# Patient Record
Sex: MALE | Race: WHITE | NOT HISPANIC OR LATINO | Employment: FULL TIME | URBAN - NONMETROPOLITAN AREA
[De-identification: names, ages, dates, MRNs, and addresses within clinical notes are randomized per-mention and may not be internally consistent; named-entity substitution may affect disease eponyms.]

---

## 2022-06-27 ENCOUNTER — OFFICE VISIT (OUTPATIENT)
Dept: FAMILY MEDICINE CLINIC | Facility: CLINIC | Age: 67
End: 2022-06-27

## 2022-06-27 VITALS
WEIGHT: 254 LBS | HEIGHT: 70 IN | OXYGEN SATURATION: 99 % | BODY MASS INDEX: 36.36 KG/M2 | SYSTOLIC BLOOD PRESSURE: 124 MMHG | TEMPERATURE: 96.8 F | DIASTOLIC BLOOD PRESSURE: 80 MMHG | HEART RATE: 76 BPM

## 2022-06-27 DIAGNOSIS — E66.09 CLASS 2 OBESITY DUE TO EXCESS CALORIES WITHOUT SERIOUS COMORBIDITY WITH BODY MASS INDEX (BMI) OF 36.0 TO 36.9 IN ADULT: ICD-10-CM

## 2022-06-27 DIAGNOSIS — M67.472 GANGLION CYST OF LEFT FOOT: Primary | ICD-10-CM

## 2022-06-27 PROBLEM — M81.0 OSTEOPOROSIS: Status: ACTIVE | Noted: 2019-08-14

## 2022-06-27 PROBLEM — E66.812 CLASS 2 OBESITY DUE TO EXCESS CALORIES WITHOUT SERIOUS COMORBIDITY WITH BODY MASS INDEX (BMI) OF 36.0 TO 36.9 IN ADULT: Status: ACTIVE | Noted: 2022-06-27

## 2022-06-27 PROBLEM — E27.40 ADRENAL INSUFFICIENCY: Status: ACTIVE | Noted: 2017-06-15

## 2022-06-27 PROBLEM — H72.90 TYMPANIC MEMBRANE PERFORATION: Status: ACTIVE | Noted: 2018-11-13

## 2022-06-27 PROBLEM — Z79.83 LONG TERM (CURRENT) USE OF BISPHOSPHONATES: Status: ACTIVE | Noted: 2020-09-28

## 2022-06-27 PROBLEM — H66.90 ACUTE OTITIS MEDIA: Status: ACTIVE | Noted: 2018-11-13

## 2022-06-27 PROCEDURE — 99213 OFFICE O/P EST LOW 20 MIN: CPT | Performed by: FAMILY MEDICINE

## 2022-06-27 RX ORDER — HYDROCORTISONE 10 MG/1
TABLET ORAL
COMMUNITY
Start: 2022-06-02

## 2022-06-27 RX ORDER — LEVOTHYROXINE SODIUM 175 UG/1
1 TABLET ORAL DAILY
COMMUNITY
Start: 2022-04-12

## 2022-06-27 RX ORDER — LACOSAMIDE 100 MG/1
TABLET, FILM COATED ORAL
COMMUNITY
Start: 2022-06-12

## 2022-06-27 RX ORDER — TESTOSTERONE 16.2 MG/G
GEL TRANSDERMAL
COMMUNITY
Start: 2022-06-07

## 2022-06-27 NOTE — ASSESSMENT & PLAN NOTE
We will see if we can get him into podiatry a little bit sooner to talk about options for drainage or removal.

## 2022-06-27 NOTE — PROGRESS NOTES
Date: 2022   Patient Name: Romero Trujillo  : 1955   MRN: [id number]     Chief Complaint:    Chief Complaint   Patient presents with   • Foot Lesion     Left foot        History of Present Illness: Romero Trujillo is a 67 y.o. male who is here today for Lesion of the left foot.  He reports a mass overlying the DIP joint of his left second toe.  He states this has been present for a couple of months but now is rubbing on his shoes and has become painful.  He denies drainage from the lesion or surrounding erythema.  He does report callus overlying the lesion from rubbing on clothing and shoes.  He has called podiatry but they cannot get him in for over a month.  He is wondering if anything else can be done to help with his discomfort.           Review of Systems:   Review of Systems   Constitutional: Negative for fatigue.   Eyes: Negative for blurred vision.   Respiratory: Negative for cough, chest tightness and shortness of breath.    Cardiovascular: Negative for chest pain, palpitations and leg swelling.   Gastrointestinal: Negative for abdominal pain, constipation, diarrhea, nausea and vomiting.   Skin: Positive for skin lesions. Negative for rash.   Neurological: Negative for dizziness, tremors and headache.   Psychiatric/Behavioral: Negative for depressed mood. The patient is not nervous/anxious.        Past Medical History:   Past Medical History:   Diagnosis Date   • Hyperthyroidism        Past Surgical History:   Past Surgical History:   Procedure Laterality Date   • TUMOR REMOVAL         Family History: History reviewed. No pertinent family history.    Social History:   Social History     Socioeconomic History   • Marital status:      Spouse name: Maylin   • Number of children: 2   Tobacco Use   • Smoking status: Never Smoker   • Smokeless tobacco: Never Used   Vaping Use   • Vaping Use: Never used   Substance and Sexual Activity   • Alcohol use: Never   • Drug use: Never   •  "Sexual activity: Defer       Medications:     Current Outpatient Medications:   •  hydrocortisone (CORTEF) 10 MG tablet, TAKE 1 TABLET BY MOUTH TWICE DAILY. INCREASE DOSE WITH STRESS, Disp: , Rfl:   •  levothyroxine (SYNTHROID, LEVOTHROID) 175 MCG tablet, Take 1 tablet by mouth Daily., Disp: , Rfl:   •  Testosterone 20.25 MG/ACT (1.62%) gel, Apply 2 pumps daily, Disp: , Rfl:   •  Vimpat 100 MG tablet tablet, TAKE 1 AND 1/2 TABLET BY MOUTH EVERY MORNING AND 1 TABLET EVERY EVENING, Disp: , Rfl:     Allergies:   Allergies   Allergen Reactions   • Sulfacetamide Other (See Comments) and Unknown (See Comments)     was too little to remember         Physical Exam:  Vital Signs:   Vitals:    06/27/22 1316   BP: 124/80   BP Location: Left arm   Patient Position: Sitting   Cuff Size: Large Adult   Pulse: 76   Temp: 96.8 °F (36 °C)   TempSrc: Temporal   SpO2: 99%   Weight: 115 kg (254 lb)   Height: 177.8 cm (70\")     Body mass index is 36.45 kg/m².     Physical Exam  Vitals and nursing note reviewed.   Constitutional:       Appearance: Normal appearance. He is obese.   HENT:      Head: Normocephalic.   Eyes:      Conjunctiva/sclera: Conjunctivae normal.   Pulmonary:      Effort: Pulmonary effort is normal.   Musculoskeletal:      Comments: Ganglion cyst of DIP joint of the left second toe with overlying callus formation.  No purulent drainage or surrounding erythema   Neurological:      Mental Status: He is alert and oriented to person, place, and time.   Psychiatric:         Mood and Affect: Mood normal.         Behavior: Behavior normal.           Assessment/Plan:   Diagnoses and all orders for this visit:    1. Ganglion cyst of left foot (Primary)  Assessment & Plan:  We will see if we can get him into podiatry a little bit sooner to talk about options for drainage or removal.       2. Class 2 obesity due to excess calories without serious comorbidity with body mass index (BMI) of 36.0 to 36.9 in adult  Assessment & " Plan:  Patient's (Body mass index is 36.45 kg/m².) indicates that they are obese (BMI >30) with health conditions that include none . Weight is unchanged. BMI is is above average; BMI management plan is completed. We discussed portion control and increasing exercise.            Follow Up:   Return if symptoms worsen or fail to improve.    Geraldine Williamson, DO  [facility]

## 2022-06-27 NOTE — ASSESSMENT & PLAN NOTE
Patient's (Body mass index is 36.45 kg/m².) indicates that they are obese (BMI >30) with health conditions that include none . Weight is unchanged. BMI is is above average; BMI management plan is completed. We discussed portion control and increasing exercise.

## 2022-07-25 ENCOUNTER — TELEPHONE (OUTPATIENT)
Dept: FAMILY MEDICINE CLINIC | Facility: CLINIC | Age: 67
End: 2022-07-25

## 2022-07-25 NOTE — TELEPHONE ENCOUNTER
PATIENT HAS POSITIVE COVID.  SYMPTOMS INCLUDE FEVER, DON'T FEEL GOOD.  A LITTLE COUGH EARLIER BUT NOT NOW.    WHAT TO DO OR TAKE?    PLEASE CALL [phone]

## 2023-04-07 ENCOUNTER — OFFICE VISIT (OUTPATIENT)
Dept: FAMILY MEDICINE CLINIC | Facility: CLINIC | Age: 68
End: 2023-04-07
Payer: COMMERCIAL

## 2023-04-07 VITALS
BODY MASS INDEX: 35.37 KG/M2 | HEART RATE: 77 BPM | HEIGHT: 70 IN | WEIGHT: 247.1 LBS | OXYGEN SATURATION: 99 % | DIASTOLIC BLOOD PRESSURE: 82 MMHG | SYSTOLIC BLOOD PRESSURE: 125 MMHG

## 2023-04-07 DIAGNOSIS — R60.0 LOCALIZED EDEMA: ICD-10-CM

## 2023-04-07 DIAGNOSIS — Z12.11 COLON CANCER SCREENING: ICD-10-CM

## 2023-04-07 DIAGNOSIS — M72.2 PLANTAR FASCIITIS OF LEFT FOOT: Primary | ICD-10-CM

## 2023-04-07 RX ORDER — HYDROCODONE BITARTRATE AND ACETAMINOPHEN 7.5; 325 MG/1; MG/1
TABLET ORAL SEE ADMIN INSTRUCTIONS
COMMUNITY
Start: 2022-12-22 | End: 2023-04-07

## 2023-04-07 RX ORDER — MELOXICAM 15 MG/1
15 TABLET ORAL DAILY
Qty: 30 TABLET | Refills: 1 | Status: SHIPPED | OUTPATIENT
Start: 2023-04-07

## 2023-04-07 NOTE — PROGRESS NOTES
"Chief Complaint  left leg pain    Subjective          Romero Trujillo presents to [facility]  History of Present Illness  Patient comes in today saying he has some generalized edema of both his legs left 1 slightly worse he reports he also is having some tenderness of the lateral side of his foot and also towards the heel he says it is worse when he first gets up tries to walk and moves around says been going on for a few weeks he denies any other difficulties or trauma at this point in time      Objective   Vital Signs:   /82   Pulse 77   Ht 177.8 cm (70\")   Wt 112 kg (247 lb 1.6 oz)   SpO2 99%   BMI 35.46 kg/m²     Body mass index is 35.46 kg/m².    Review of Systems   Constitutional: Negative.    HENT: Negative for congestion, dental problem, ear discharge, ear pain and sore throat.    Respiratory: Negative for apnea, chest tightness and shortness of breath.    Cardiovascular: Positive for leg swelling.   Gastrointestinal: Negative for constipation and nausea.   Endocrine: Negative for polyuria.   Genitourinary: Negative for difficulty urinating.   Musculoskeletal: Positive for arthralgias. Negative for gait problem.   Skin: Negative for rash.   Hematological: Negative for adenopathy.       Past History:  Medical History: has a past medical history of Hyperthyroidism.   Surgical History: has a past surgical history that includes Tumor removal.         Current Outpatient Medications:   •  hydrocortisone (CORTEF) 10 MG tablet, TAKE 1 TABLET BY MOUTH TWICE DAILY. INCREASE DOSE WITH STRESS, Disp: , Rfl:   •  meloxicam (MOBIC) 15 MG tablet, Take 1 tablet by mouth Daily., Disp: 30 tablet, Rfl: 1  •  Testosterone 20.25 MG/ACT (1.62%) gel, Apply 2 pumps daily, Disp: , Rfl:   •  Vimpat 100 MG tablet tablet, TAKE 1 AND 1/2 TABLET BY MOUTH EVERY MORNING AND 1 TABLET EVERY EVENING, Disp: , Rfl:     Allergies: Sulfacetamide    Physical Exam  Vitals reviewed.   Constitutional:       " Appearance: Normal appearance.   HENT:      Head: Normocephalic.      Right Ear: Tympanic membrane, ear canal and external ear normal.      Left Ear: Tympanic membrane, ear canal and external ear normal.      Nose: Nose normal.      Mouth/Throat:      Pharynx: Oropharynx is clear.   Eyes:      Pupils: Pupils are equal, round, and reactive to light.   Cardiovascular:      Rate and Rhythm: Normal rate and regular rhythm.      Pulses: Normal pulses.      Comments: Trace edema on right leg  Pulmonary:      Effort: Pulmonary effort is normal.      Breath sounds: Normal breath sounds.   Abdominal:      General: Abdomen is flat. Bowel sounds are normal.      Palpations: Abdomen is soft.   Musculoskeletal:         General: Normal range of motion.      Left lower le+ Pitting Edema present.      Comments: Tenderness of the calcaneus on the left bottom of the foot slight tenderness of the Achilles tendon and also of the ankle he does have edema of around the ankle as well the left foot and he has negative Homans signs bilaterally   Skin:     General: Skin is warm and dry.   Neurological:      General: No focal deficit present.      Mental Status: He is alert and oriented to person, place, and time.          Result Review :                   Assessment and Plan    Diagnoses and all orders for this visit:    1. Plantar fasciitis of left foot (Primary)  Comments:  Celebrex exercises arch supports monitor elevation and see if this does not improve    2. Colon cancer screening  Comments:  We will arrange Cologuard  Orders:  -     Cologuard - Stool, Per Rectum; Future    3. Localized edema  Comments:  Discussed pressure stockings elevations and monitor    Other orders  -     meloxicam (MOBIC) 15 MG tablet; Take 1 tablet by mouth Daily.  Dispense: 30 tablet; Refill: 1              Follow Up   No follow-ups on file.  Patient was given instructions and counseling regarding his condition or for health maintenance advice. Please see  specific information pulled into the AVS if appropriate.     Ben Sood MD

## 2023-05-02 ENCOUNTER — OFFICE VISIT (OUTPATIENT)
Dept: FAMILY MEDICINE CLINIC | Facility: CLINIC | Age: 68
End: 2023-05-02
Payer: COMMERCIAL

## 2023-05-02 VITALS
TEMPERATURE: 97.5 F | BODY MASS INDEX: 35.07 KG/M2 | SYSTOLIC BLOOD PRESSURE: 130 MMHG | OXYGEN SATURATION: 96 % | HEART RATE: 95 BPM | DIASTOLIC BLOOD PRESSURE: 92 MMHG | WEIGHT: 245 LBS | HEIGHT: 70 IN

## 2023-05-02 DIAGNOSIS — K62.89 ANAL OR RECTAL PAIN: Primary | ICD-10-CM

## 2023-05-02 RX ORDER — AMOXICILLIN AND CLAVULANATE POTASSIUM 875; 125 MG/1; MG/1
1 TABLET, FILM COATED ORAL 2 TIMES DAILY
Qty: 20 TABLET | Refills: 0 | Status: SHIPPED | OUTPATIENT
Start: 2023-05-02

## 2023-05-02 NOTE — PROGRESS NOTES
Office Note     Name: Romero Trujillo    : 1955     MRN: [id number]     Chief Complaint  Hemorrhoids    Subjective     History of Present Illness:  Romero Trujillo is a 68 y.o. male who presents today for eval of hemorrhoids for about a week.  He states that he has not had any bleeding, but he has tenderness around his anus and has had a little bit of difficulty having a bowel movement.  He does not recall straining in any way prior to the symptoms.  He denies having any diarrhea or constipation.  He states that he normally has daily BMs, which are soft but not loose.  He states that he has been using Preparation H on the area for a week, but he has not noticed any improvement.  He has never had a colonoscopy, but he did recently complete a Cologuard, which was negative.    Review of Systems:   Review of Systems   Constitutional: Negative for activity change and unexpected weight loss.   Gastrointestinal: Positive for rectal pain. Negative for abdominal pain, anal bleeding, blood in stool, constipation, diarrhea, nausea and vomiting.       Past Medical History:   Past Medical History:   Diagnosis Date   • Hyperthyroidism        Past Surgical History:   Past Surgical History:   Procedure Laterality Date   • TUMOR REMOVAL         Family History: History reviewed. No pertinent family history.    Social History:   Social History     Socioeconomic History   • Marital status:      Spouse name: Maylin   • Number of children: 2   Tobacco Use   • Smoking status: Never   • Smokeless tobacco: Never   Vaping Use   • Vaping Use: Never used   Substance and Sexual Activity   • Alcohol use: Never   • Drug use: Never   • Sexual activity: Defer       Immunizations:   Immunization History   Administered Date(s) Administered   • COVID-19 (PFIZER) Purple Cap Monovalent 2021, 2021, 10/31/2021   • Flu Vaccine Split Quad 10/28/2013   • FluLaval/Fluzone >6mos 10/28/2020   • Fluzone High-Dose 65+yrs  "10/25/2022   • Fluzone Quad >6mos (Multi-dose) 11/24/2019, 10/28/2020   • Influenza, Unspecified 10/28/2020   • Zoster, Unspecified 12/08/2016        Medications:     Current Outpatient Medications:   •  hydrocortisone (CORTEF) 10 MG tablet, TAKE 1 TABLET BY MOUTH TWICE DAILY. INCREASE DOSE WITH STRESS, Disp: , Rfl:   •  meloxicam (MOBIC) 15 MG tablet, Take 1 tablet by mouth Daily., Disp: 30 tablet, Rfl: 1  •  Testosterone 20.25 MG/ACT (1.62%) gel, Apply 2 pumps daily, Disp: , Rfl:   •  Vimpat 100 MG tablet tablet, TAKE 1 AND 1/2 TABLET BY MOUTH EVERY MORNING AND 1 TABLET EVERY EVENING, Disp: , Rfl:   •  amoxicillin-clavulanate (Augmentin) 875-125 MG per tablet, Take 1 tablet by mouth 2 (Two) Times a Day., Disp: 20 tablet, Rfl: 0    Allergies:   Allergies   Allergen Reactions   • Sulfacetamide Other (See Comments) and Unknown (See Comments)     was too little to remember       Objective     Vital Signs  /92 (BP Location: Left arm, Patient Position: Sitting, Cuff Size: Large Adult)   Pulse 95   Temp 97.5 °F (36.4 °C) (Temporal)   Ht 177.8 cm (70\")   Wt 111 kg (245 lb)   SpO2 96%   BMI 35.15 kg/m²   Estimated body mass index is 35.15 kg/m² as calculated from the following:    Height as of this encounter: 177.8 cm (70\").    Weight as of this encounter: 111 kg (245 lb).    Physical Exam  Vitals and nursing note reviewed. Exam conducted with a chaperone present.   Constitutional:       General: He is not in acute distress.     Appearance: Normal appearance.   Cardiovascular:      Rate and Rhythm: Normal rate and regular rhythm.      Pulses: Normal pulses.      Heart sounds: Normal heart sounds.   Pulmonary:      Effort: Pulmonary effort is normal.      Breath sounds: Normal breath sounds.   Genitourinary:     Rectum: Tenderness and external hemorrhoid ( No erythema or swelling) present. No anal fissure.       Neurological:      General: No focal deficit present.      Mental Status: He is alert and oriented to " person, place, and time.      Cranial Nerves: No cranial nerve deficit.      Gait: Gait normal.   Psychiatric:         Mood and Affect: Mood normal.         Behavior: Behavior normal.         Thought Content: Thought content normal.         Judgment: Judgment normal.        Assessment and Plan     Assessment/Plan:  Diagnoses and all orders for this visit:    1. Anal or rectal pain (Primary)  Assessment & Plan:  I will start a course of antibiotics today, and I advised symptomatic treatment, such as sitz bath.  I recommend stool softeners as well to prevent further irritation.  We will recheck in 1 week, and refer if needed.  Patient advised to contact us if symptoms worsen, and he is in agreement.    Orders:  -     amoxicillin-clavulanate (Augmentin) 875-125 MG per tablet; Take 1 tablet by mouth 2 (Two) Times a Day.  Dispense: 20 tablet; Refill: 0      Follow Up  Return in about 1 week (around 5/9/2023) for Recheck.    Rosalie Lua PA-C  [facility]

## 2023-05-03 PROBLEM — K62.89 ANAL OR RECTAL PAIN: Status: ACTIVE | Noted: 2023-05-03

## 2023-05-03 NOTE — ASSESSMENT & PLAN NOTE
I will start a course of antibiotics today, and I advised symptomatic treatment, such as sitz bath.  I recommend stool softeners as well to prevent further irritation.  We will recheck in 1 week, and refer if needed.  Patient advised to contact us if symptoms worsen, and he is in agreement.

## 2023-05-08 ENCOUNTER — OFFICE VISIT (OUTPATIENT)
Dept: FAMILY MEDICINE CLINIC | Facility: CLINIC | Age: 68
End: 2023-05-08
Payer: COMMERCIAL

## 2023-05-08 VITALS
HEIGHT: 70 IN | HEART RATE: 68 BPM | SYSTOLIC BLOOD PRESSURE: 130 MMHG | BODY MASS INDEX: 35.28 KG/M2 | TEMPERATURE: 97.1 F | OXYGEN SATURATION: 98 % | WEIGHT: 246.4 LBS | DIASTOLIC BLOOD PRESSURE: 90 MMHG

## 2023-05-08 DIAGNOSIS — K62.89 ANAL OR RECTAL PAIN: Primary | ICD-10-CM

## 2023-05-08 NOTE — ASSESSMENT & PLAN NOTE
Symptoms have significantly improved with antibiotics, so I advised him to complete full course.  I do recommend that he see surgery to evaluate for internal hemorrhoids and for the source of the infection.  He prefers to have this evaluation as well.

## 2024-06-13 ENCOUNTER — OFFICE VISIT (OUTPATIENT)
Dept: FAMILY MEDICINE CLINIC | Facility: CLINIC | Age: 69
End: 2024-06-13
Payer: COMMERCIAL

## 2024-06-13 ENCOUNTER — TELEPHONE (OUTPATIENT)
Dept: FAMILY MEDICINE CLINIC | Facility: CLINIC | Age: 69
End: 2024-06-13

## 2024-06-13 VITALS
SYSTOLIC BLOOD PRESSURE: 118 MMHG | TEMPERATURE: 97.8 F | WEIGHT: 241 LBS | BODY MASS INDEX: 34.5 KG/M2 | DIASTOLIC BLOOD PRESSURE: 76 MMHG | HEIGHT: 70 IN | OXYGEN SATURATION: 97 % | HEART RATE: 74 BPM

## 2024-06-13 DIAGNOSIS — K64.8 INTERNAL HEMORRHOIDS WITHOUT COMPLICATION: Primary | ICD-10-CM

## 2024-06-13 PROCEDURE — 99213 OFFICE O/P EST LOW 20 MIN: CPT | Performed by: NURSE PRACTITIONER

## 2024-06-13 RX ORDER — HYDROCORTISONE ACETATE PRAMOXINE HCL 1; 1 G/100G; G/100G
CREAM TOPICAL
Qty: 1 EACH | Refills: 1 | Status: SHIPPED | OUTPATIENT
Start: 2024-06-13 | End: 2024-06-20

## 2024-06-13 RX ORDER — HYDROCORTISONE ACETATE 25 MG/1
25 SUPPOSITORY RECTAL 2 TIMES DAILY PRN
Qty: 14 SUPPOSITORY | Refills: 0 | Status: SHIPPED | OUTPATIENT
Start: 2024-06-13 | End: 2024-06-13

## 2024-06-13 NOTE — PROGRESS NOTES
Office Note     Name: Romero Trujillo    : 1955     MRN: [id number]     Chief Complaint  Hemorrhoids (X3 days)    Subjective     History of Present Illness:  Romero Trujillo is a 69 y.o. male who presents today for possible hemorrhoid. He admits he strained when having a BM while traveling. He states his anal area hurts when he sits - he denies seeing any external hemorrhoids.  He has had this in the past (approx. 1 year) and was treated with medication successfully. He was referred to surgeon but it was determined he did not need any procedure at that time. He denies seeing any blood in stool or when he wipes. Had negative Cologuard in 2023. Denies any diarrhea, abd pain, fever or unexplained weight loss.    Review of Systems:   Review of Systems as per HPI.    Family History: History reviewed. No pertinent family history.    Social History:   Social History     Socioeconomic History    Marital status:      Spouse name: Maylin    Number of children: 2   Tobacco Use    Smoking status: Never    Smokeless tobacco: Never   Vaping Use    Vaping status: Never Used   Substance and Sexual Activity    Alcohol use: Never    Drug use: Never    Sexual activity: Defer       Past Medical History:   Past Medical History:   Diagnosis Date    Hyperthyroidism        Past Surgical History:   Past Surgical History:   Procedure Laterality Date    TUMOR REMOVAL         Immunizations:   Immunization History   Administered Date(s) Administered    COVID-19 (PFIZER) Purple Cap Monovalent 2021, 2021, 10/31/2021    Flu Vaccine Split Quad 10/28/2013    Fluzone (or Fluarix & Flulaval for VFC) >6mos 10/28/2020    Fluzone High-Dose 65+yrs 10/25/2022    Fluzone Quad >6mos (Multi-dose) 2019, 10/28/2020    Influenza, Unspecified 10/28/2020    Zoster, Unspecified 2016        Medications:     Current Outpatient Medications:     hydrocortisone (CORTEF) 10 MG tablet, TAKE 1 TABLET BY MOUTH TWICE  "DAILY. INCREASE DOSE WITH STRESS, Disp: , Rfl:     meloxicam (MOBIC) 15 MG tablet, Take 1 tablet by mouth Daily., Disp: 30 tablet, Rfl: 1    Testosterone 20.25 MG/ACT (1.62%) gel, Apply 2 pumps daily, Disp: , Rfl:     Vimpat 100 MG tablet tablet, TAKE 1 AND 1/2 TABLET BY MOUTH EVERY MORNING AND 1 TABLET EVERY EVENING, Disp: , Rfl:     amoxicillin-clavulanate (Augmentin) 875-125 MG per tablet, Take 1 tablet by mouth 2 (Two) Times a Day. (Patient not taking: Reported on 6/13/2024), Disp: 20 tablet, Rfl: 0    hydrocortisone (ANUSOL-HC) 25 MG suppository, Insert 1 suppository into the rectum 2 (Two) Times a Day As Needed for Hemorrhoids for up to 7 days., Disp: 14 suppository, Rfl: 0    Allergies:   Allergies   Allergen Reactions    Sulfa Antibiotics Hives    Sulfacetamide Other (See Comments) and Unknown (See Comments)     was too little to remember       Objective     Vital Signs  /76 (BP Location: Left arm, Patient Position: Sitting, Cuff Size: Large Adult)   Pulse 74   Temp 97.8 °F (36.6 °C)   Ht 177.8 cm (70\")   Wt 109 kg (241 lb)   SpO2 97%   BMI 34.58 kg/m²   Estimated body mass index is 34.58 kg/m² as calculated from the following:    Height as of this encounter: 177.8 cm (70\").    Weight as of this encounter: 109 kg (241 lb).          Physical Exam  Vitals and nursing note reviewed.   Constitutional:       General: He is not in acute distress.     Appearance: Normal appearance. He is not ill-appearing.   Cardiovascular:      Rate and Rhythm: Normal rate.   Pulmonary:      Effort: Pulmonary effort is normal.   Abdominal:      General: There is no distension.      Tenderness: There is no abdominal tenderness.   Genitourinary:     Comments: Declined rectal exam  Skin:     General: Skin is warm and dry.   Neurological:      Mental Status: He is alert and oriented to person, place, and time.   Psychiatric:         Mood and Affect: Mood normal.         Thought Content: Thought content normal.      "     Procedures    Assessment and Plan     1. Internal hemorrhoids without complication    - hydrocortisone (ANUSOL-HC) 25 MG suppository; Insert 1 suppository into the rectum 2 (Two) Times a Day As Needed for Hemorrhoids for up to 7 days.  Dispense: 14 suppository; Refill: 0     Reviewed use of hydrocortisone suppositories including side effects.  Suggested use of witch hazel wipes and increased fluid intake. Can use stool softeners to help ease passage. RTC if s/s worsen or do not improve with medication use or for any new concerns. Patient stated understanding and agreed with POC.     Follow Up  Return if symptoms worsen or fail to improve, for Recheck.    [facility]  [address] [place] [address] [address] [place] [address] [phone]

## 2024-06-13 NOTE — TELEPHONE ENCOUNTER
Caller: Romero Trujillo    Relationship: Self    Best call back number: [phone]     Which medication are you concerned about: hydrocortisone (ANUSOL-HC) 25 MG suppository     Who prescribed you this medication: MARGARETTE EDMOND    When did you start taking this medication: HASN'T    What are your concerns: PT STATED THAT THE MEDICATION IS NOT COVERED BY INSURANCE AND ALSO NOT FDA APPROVED ACCORDING TO PHARMACY HELP DESK ([phone]). PT IS ASKING FOR ALTERNATIVE MEDICATION. GENERIC IS ALSO NOT COVERED BY INSURANCE.

## 2024-11-27 ENCOUNTER — OFFICE VISIT (OUTPATIENT)
Dept: FAMILY MEDICINE CLINIC | Facility: CLINIC | Age: 69
End: 2024-11-27
Payer: COMMERCIAL

## 2024-11-27 VITALS
SYSTOLIC BLOOD PRESSURE: 138 MMHG | OXYGEN SATURATION: 95 % | DIASTOLIC BLOOD PRESSURE: 84 MMHG | BODY MASS INDEX: 34.07 KG/M2 | HEART RATE: 103 BPM | WEIGHT: 238 LBS | RESPIRATION RATE: 16 BRPM | HEIGHT: 70 IN | TEMPERATURE: 99.2 F

## 2024-11-27 DIAGNOSIS — U07.1 COVID-19 VIRUS DETECTED: Primary | ICD-10-CM

## 2024-11-27 DIAGNOSIS — R05.9 COUGH, UNSPECIFIED TYPE: ICD-10-CM

## 2024-11-27 LAB
EXPIRATION DATE: ABNORMAL
EXPIRATION DATE: NORMAL
FLUAV AG UPPER RESP QL IA.RAPID: NOT DETECTED
FLUBV AG UPPER RESP QL IA.RAPID: NOT DETECTED
INTERNAL CONTROL: ABNORMAL
INTERNAL CONTROL: NORMAL
Lab: ABNORMAL
Lab: NORMAL
S PYO AG THROAT QL: NEGATIVE
SARS-COV-2 AG UPPER RESP QL IA.RAPID: DETECTED

## 2024-11-27 PROCEDURE — 87880 STREP A ASSAY W/OPTIC: CPT | Performed by: PHYSICIAN ASSISTANT

## 2024-11-27 PROCEDURE — 87428 SARSCOV & INF VIR A&B AG IA: CPT | Performed by: PHYSICIAN ASSISTANT

## 2024-11-27 PROCEDURE — 99213 OFFICE O/P EST LOW 20 MIN: CPT | Performed by: PHYSICIAN ASSISTANT

## 2024-11-27 RX ORDER — LEVOTHYROXINE SODIUM 150 UG/1
150 TABLET ORAL DAILY
COMMUNITY
Start: 2024-08-07

## 2024-11-27 NOTE — ASSESSMENT & PLAN NOTE
Paxlovid prescribed with benefits and risks discussed.  Recent labs reviewed from [facility].  There is no need to adjust dosage.  Advised increase fluids, symptomatic treatment, monitoring and rest.  I recommend ER if difficulty breathing or urgent care if worsening symptoms over the holiday weekend.  Discussed quarantine recommendations, and he expressed understanding.

## 2024-11-27 NOTE — PROGRESS NOTES
Office Note     Name: Romero Trujillo    : 1955     MRN: [id number]     Chief Complaint  Cough (Since yesterday, has not tried anything OTC. Denies fever, reports headache & body aches) and URI    Subjective   Patient or patient representative verbalized consent for the use of Ambient Listening during the visit with  Rosalie Lua PA-C for chart documentation. 2024  10:17 EST      Romero Trujillo is a 69 y.o. male complaining of congestion and bodyaches x 2 days.  He denies any known fever, but he has had symptoms of a fever.  He has been coughing some, but he denies any shortness of breath or tightness in his chest.  He denies sore throat or earache.  He also denies any GI effects.  He has not been taking any medications for symptoms.    Review of Systems:   Review of Systems   Constitutional:  Positive for fever. Negative for chills and fatigue.   HENT:  Positive for congestion and rhinorrhea. Negative for ear pain, sinus pressure and sore throat.         See HPI   Respiratory:  Positive for cough. Negative for shortness of breath.    Cardiovascular:  Negative for chest pain and palpitations.   Gastrointestinal:  Negative for diarrhea, nausea and vomiting.   Musculoskeletal:  Positive for myalgias.   Neurological:  Positive for headache.       Past Medical History:   Past Medical History:   Diagnosis Date    Hyperthyroidism        Past Surgical History:   Past Surgical History:   Procedure Laterality Date    TUMOR REMOVAL         Family History: History reviewed. No pertinent family history.    Social History:   Social History     Socioeconomic History    Marital status:      Spouse name: Maylin    Number of children: 2   Tobacco Use    Smoking status: Never    Smokeless tobacco: Never   Vaping Use    Vaping status: Never Used   Substance and Sexual Activity    Alcohol use: Never    Drug use: Never    Sexual activity: Defer       Immunizations:   Immunization History  "  Administered Date(s) Administered    COVID-19 (PFIZER) Purple Cap Monovalent 03/04/2021, 04/01/2021, 10/31/2021    Flu Vaccine Split Quad 10/28/2013    Fluzone (or Fluarix & Flulaval for VFC) >6mos 10/28/2020    Fluzone High-Dose 65+yrs 10/25/2022    Fluzone Quad >6mos (Multi-dose) 11/24/2019, 10/28/2020    Influenza, Unspecified 10/28/2020    Zoster, Unspecified 12/08/2016        Medications:     Current Outpatient Medications:     hydrocortisone (CORTEF) 10 MG tablet, TAKE 1 TABLET BY MOUTH TWICE DAILY. INCREASE DOSE WITH STRESS, Disp: , Rfl:     levothyroxine (SYNTHROID, LEVOTHROID) 150 MCG tablet, Take 1 tablet by mouth Daily., Disp: , Rfl:     Testosterone 20.25 MG/ACT (1.62%) gel, Apply 2 pumps daily, Disp: , Rfl:     Vimpat 100 MG tablet tablet, TAKE 1 AND 1/2 TABLET BY MOUTH EVERY MORNING AND 1 TABLET EVERY EVENING, Disp: , Rfl:     meloxicam (MOBIC) 15 MG tablet, Take 1 tablet by mouth Daily. (Patient not taking: Reported on 11/27/2024), Disp: 30 tablet, Rfl: 1    Nirmatrelvir & Ritonavir, 300mg/100mg, (PAXLOVID), Take 3 tablets by mouth 2 (Two) Times a Day., Disp: 30 each, Rfl: 0    Allergies:   Allergies   Allergen Reactions    Sulfa Antibiotics Hives and Rash    Sulfacetamide Unknown (See Comments)     was too little to remember       Objective     Vital Signs  /84 (BP Location: Left arm, Patient Position: Sitting, Cuff Size: Large Adult)   Pulse 103   Temp 99.2 °F (37.3 °C) (Oral)   Resp 16   Ht 177.8 cm (70\")   Wt 108 kg (238 lb)   SpO2 95%   BMI 34.15 kg/m²   Estimated body mass index is 34.15 kg/m² as calculated from the following:    Height as of this encounter: 177.8 cm (70\").    Weight as of this encounter: 108 kg (238 lb).        Physical Exam  Vitals and nursing note reviewed.   Constitutional:       General: He is not in acute distress.     Appearance: Normal appearance. He is not ill-appearing.   HENT:      Head: Normocephalic and atraumatic.      Right Ear: Tympanic membrane " and external ear normal.      Left Ear: Tympanic membrane and external ear normal.      Mouth/Throat:      Pharynx: Oropharynx is clear.   Cardiovascular:      Rate and Rhythm: Normal rate and regular rhythm.      Pulses: Normal pulses.      Heart sounds: Normal heart sounds.   Pulmonary:      Effort: Pulmonary effort is normal. No respiratory distress.      Breath sounds: Normal breath sounds.   Musculoskeletal:      Right lower leg: No edema.      Left lower leg: No edema.   Skin:     General: Skin is warm and dry.   Neurological:      General: No focal deficit present.      Mental Status: He is alert and oriented to person, place, and time.      Motor: No weakness.      Coordination: Coordination normal.   Psychiatric:         Mood and Affect: Mood normal.         Behavior: Behavior normal.          Results:  Recent Results (from the past 24 hours)   POCT SARS-CoV-2 Antigen PACO + Flu    Collection Time: 11/27/24 10:38 AM    Specimen: Swab   Result Value Ref Range    SARS Antigen Detected (A) Not Detected, Presumptive Negative    Influenza A Antigen PACO Not Detected Not Detected    Influenza B Antigen PACO Not Detected Not Detected    Internal Control Passed Passed    Lot Number [id number]     Expiration Date 11/01/2025    POCT rapid strep A    Collection Time: 11/27/24 10:41 AM    Specimen: Swab   Result Value Ref Range    Rapid Strep A Screen Negative Negative, VALID, INVALID, Not Performed    Internal Control Passed Passed    Lot Number [id number]     Expiration Date 01/03/2027           Assessment and Plan       Diagnoses and all orders for this visit:    1. COVID-19 virus detected (Primary)  Assessment & Plan:  Paxlovid prescribed with benefits and risks discussed.  Recent labs reviewed from .  There is no need to adjust dosage.  Advised increase fluids, symptomatic treatment, monitoring and rest.  I recommend ER if difficulty breathing or urgent care if worsening symptoms over the holiday weekend.  Discussed  quarantine recommendations, and he expressed understanding.    Orders:  -     Nirmatrelvir & Ritonavir, 300mg/100mg, (PAXLOVID); Take 3 tablets by mouth 2 (Two) Times a Day.  Dispense: 30 each; Refill: 0    2. Cough, unspecified type  Comments:  Advised Mucinex for cough.  Orders:  -     POCT SARS-CoV-2 Antigen PACO + Flu  -     POCT rapid strep A        Follow Up  Return for annual physical when due or sooner PRN.    Rosalie Lua PA-C  [facility] Internal Medicine [facility]

## 2025-07-19 NOTE — PROGRESS NOTES
Office Note     Name: Romero Trujillo    : 1955     MRN: [id number]     Chief Complaint  Hemorrhoids    Subjective     History of Present Illness:  Romero Trujillo is a 68 y.o. male who presents today for eval of rectal pain and swelling.  He states that it started improving within a couple of days of starting the antibiotics, and he has continued them with no problems.  He states that it did bleed a little bit for 1 to 2 days, but it has not since then.  He denies any pain with bowel movements.  He states he did take 1 stool softener, which seemed to be enough to continue having regular bowel movements.  He still has about 4 days left on his antibiotics.    Review of Systems:   Review of Systems   Gastrointestinal: Positive for anal bleeding ( Resolved) and rectal pain (Resolved). Negative for constipation and diarrhea.       Past Medical History:   Past Medical History:   Diagnosis Date   • Hyperthyroidism        Past Surgical History:   Past Surgical History:   Procedure Laterality Date   • TUMOR REMOVAL         Family History: History reviewed. No pertinent family history.    Social History:   Social History     Socioeconomic History   • Marital status:      Spouse name: Maylin   • Number of children: 2   Tobacco Use   • Smoking status: Never   • Smokeless tobacco: Never   Vaping Use   • Vaping Use: Never used   Substance and Sexual Activity   • Alcohol use: Never   • Drug use: Never   • Sexual activity: Defer       Immunizations:   Immunization History   Administered Date(s) Administered   • COVID-19 (PFIZER) Purple Cap Monovalent 2021, 2021, 10/31/2021   • Flu Vaccine Split Quad 10/28/2013   • FluLaval/Fluzone >6mos 10/28/2020   • Fluzone High-Dose 65+yrs 10/25/2022   • Fluzone Quad >6mos (Multi-dose) 2019, 10/28/2020   • Influenza, Unspecified 10/28/2020   • Zoster, Unspecified 2016        Medications:     Current Outpatient Medications:   •   "amoxicillin-clavulanate (Augmentin) 875-125 MG per tablet, Take 1 tablet by mouth 2 (Two) Times a Day., Disp: 20 tablet, Rfl: 0  •  hydrocortisone (CORTEF) 10 MG tablet, TAKE 1 TABLET BY MOUTH TWICE DAILY. INCREASE DOSE WITH STRESS, Disp: , Rfl:   •  meloxicam (MOBIC) 15 MG tablet, Take 1 tablet by mouth Daily., Disp: 30 tablet, Rfl: 1  •  Testosterone 20.25 MG/ACT (1.62%) gel, Apply 2 pumps daily, Disp: , Rfl:   •  Vimpat 100 MG tablet tablet, TAKE 1 AND 1/2 TABLET BY MOUTH EVERY MORNING AND 1 TABLET EVERY EVENING, Disp: , Rfl:     Allergies:   Allergies   Allergen Reactions   • Sulfacetamide Other (See Comments) and Unknown (See Comments)     was too little to remember       Objective     Vital Signs  /90 (BP Location: Right arm, Patient Position: Sitting, Cuff Size: Large Adult)   Pulse 68   Temp 97.1 °F (36.2 °C) (Temporal)   Ht 177.8 cm (70\")   Wt 112 kg (246 lb 6.4 oz)   SpO2 98%   BMI 35.35 kg/m²   Estimated body mass index is 35.35 kg/m² as calculated from the following:    Height as of this encounter: 177.8 cm (70\").    Weight as of this encounter: 112 kg (246 lb 6.4 oz).    Physical Exam  Vitals and nursing note reviewed. Exam conducted with a chaperone present.   Constitutional:       General: He is not in acute distress.     Appearance: Normal appearance.   Cardiovascular:      Rate and Rhythm: Normal rate and regular rhythm.      Pulses: Normal pulses.      Heart sounds: Normal heart sounds.   Pulmonary:      Effort: Pulmonary effort is normal.      Breath sounds: Normal breath sounds.   Genitourinary:     Rectum: Tenderness and external hemorrhoid ( No erythema or swelling) present. No anal fissure.         Neurological:      General: No focal deficit present.      Mental Status: He is alert and oriented to person, place, and time.      Cranial Nerves: No cranial nerve deficit.      Gait: Gait normal.   Psychiatric:         Mood and Affect: Mood normal.         Behavior: Behavior normal.    "      Thought Content: Thought content normal.         Judgment: Judgment normal.     Assessment and Plan     Assessment/Plan:  Diagnoses and all orders for this visit:    1. Anal or rectal pain (Primary)  Assessment & Plan:  Symptoms have significantly improved with antibiotics, so I advised him to complete full course.  I do recommend that he see surgery to evaluate for internal hemorrhoids and for the source of the infection.  He prefers to have this evaluation as well.    Orders:  -     Ambulatory Referral to General Surgery      Follow Up  Return for Next scheduled follow up.    Rosalie Lua PA-C  [facility] Internal Medicine [facility]   No